# Patient Record
Sex: FEMALE | Race: WHITE | NOT HISPANIC OR LATINO | Employment: PART TIME | URBAN - NONMETROPOLITAN AREA
[De-identification: names, ages, dates, MRNs, and addresses within clinical notes are randomized per-mention and may not be internally consistent; named-entity substitution may affect disease eponyms.]

---

## 2018-07-06 ENCOUNTER — OFFICE VISIT (OUTPATIENT)
Dept: URGENT CARE | Facility: PHYSICIAN GROUP | Age: 28
End: 2018-07-06
Payer: MEDICAID

## 2018-07-06 VITALS
HEIGHT: 62 IN | DIASTOLIC BLOOD PRESSURE: 70 MMHG | HEART RATE: 80 BPM | RESPIRATION RATE: 14 BRPM | BODY MASS INDEX: 26.68 KG/M2 | SYSTOLIC BLOOD PRESSURE: 108 MMHG | WEIGHT: 145 LBS | OXYGEN SATURATION: 99 % | TEMPERATURE: 99.1 F

## 2018-07-06 DIAGNOSIS — J02.0 STREP PHARYNGITIS: ICD-10-CM

## 2018-07-06 PROCEDURE — 99214 OFFICE O/P EST MOD 30 MIN: CPT | Performed by: PHYSICIAN ASSISTANT

## 2018-07-06 RX ORDER — AZITHROMYCIN 250 MG/1
TABLET, FILM COATED ORAL
Qty: 6 TAB | Refills: 0 | Status: SHIPPED | OUTPATIENT
Start: 2018-07-06 | End: 2021-04-08

## 2018-07-06 NOTE — LETTER
July 6, 2018         Patient: Veronica Rajan   YOB: 1990   Date of Visit: 7/6/2018           To Whom it May Concern:    Veronica Rajan was seen in my clinic on 7/6/2018. She may return to work on 07/08/2018, please excuse any recent absences.    If you have any questions or concerns, please don't hesitate to call.        Sincerely,           Marek Llanes P.A.-C.  Electronically Signed

## 2018-07-06 NOTE — PROGRESS NOTES
Chief Complaint   Patient presents with   • Pharyngitis     X 6 days       HISTORY OF PRESENT ILLNESS: Patient is a 28 y.o. female who presents today because she has a 6 day history of sore throat, she has some associated fevers, chills, body aches and mild nausea.  She has been taking Tylenol for her symptoms without improvement.    Patient Active Problem List    Diagnosis Date Noted   • Palpitations 04/03/2012     Priority: High   • Atypical chest pain 04/03/2012     Priority: High   • Shortness of breath 04/03/2012     Priority: High   • APC (atrial premature contractions) 04/03/2012     Priority: Medium   • VPC (ventricular premature complex) 04/03/2012     Priority: Medium   • Other diseases of nasal cavity and sinuses 07/27/2015   • Fibromyalgia 01/09/2014   • Hypothyroid 11/11/2011   • Acne 11/11/2011   • Contraception 11/11/2011       Allergies:Morphine; Other food; Pcn [penicillins]; Peanuts [peanut oil]; and Tetracycline    Current Outpatient Prescriptions Ordered in [facility]   Medication Sig Dispense Refill   • azithromycin (ZITHROMAX) 250 MG Tab Follow package directions 6 Tab 0   • levothyroxine (SYNTHROID) 75 MCG TABS Take 1 Tab by mouth every day. 90 Tab 3   • albuterol (VENTOLIN OR PROVENTIL) 108 (90 BASE) MCG/ACT Aero Soln inhalation aerosol Inhale 2 Puffs by mouth every 6 hours as needed for Shortness of Breath. 8.5 g 1   • hydrocod polst-chlorphen polst (TUSSIONEX) 10-8 MG/5ML Liquid CR Take 5 mL by mouth every 12 hours. 50 mL 0   • IV Sets-Tubing (LTXF PRIM CNV PIN MICRODRIP) Misc by Does not apply route.     • albuterol (VENTOLIN OR PROVENTIL) 108 (90 BASE) MCG/ACT Aero Soln inhalation aerosol Inhale 2 Puffs by mouth every 6 hours as needed for Shortness of Breath. 8.5 g 0   • fluticasone (FLONASE) 50 MCG/ACT nasal spray Spray 2 Sprays in nose every day. 16 g 0   • hyoscyamine (LEVSIN) 0.125 MG tablet Take 125 mcg by mouth every four hours as needed for Cramping.     • famotidine (PEPCID) 20 MG  "TABS Take 20 mg by mouth at bedtime as needed.     • Probiotic Product (PROBIOTIC DAILY PO) Take  by mouth every day.     • Non Formulary Request 5HTP   daily     • cyclobenzaprine (FLEXERIL) 10 MG TABS Take 1 Tab by mouth every day. 90 Tab 0   • omeprazole (PRILOSEC) 20 MG CPDR Take 1 Cap by mouth every day. 30 Cap 11   • gabapentin (NEURONTIN) 300 MG CAPS Take 300 mg by mouth 3 times a day.       No current [facility]-ordered facility-administered medications on file.        Past Medical History:   Diagnosis Date   • Acne 11/11/2011   • Anesthesia     \"didn't properly sedate all the way\", woke up in the middle, recommedation for Propofol   • Bronchitis 2010   • Contraception 11/11/2011   • GERD (gastroesophageal reflux disease)    • Heart burn    • Hypothyroid 11/11/2011   • Indigestion    • Infectious disease 2007    c diff   • Insomnia    • Jaundice     as a baby   • Muscle disorder     muscle pain   • Other specified disorder of intestines 7/2015    constipation and diarrhea       Social History   Substance Use Topics   • Smoking status: Never Smoker   • Smokeless tobacco: Never Used   • Alcohol use No       Family Status   Relation Status   • Father Alive   • Mother Alive   • Sister Alive   • Brother Alive   • Sister Alive   • Sister Alive     Family History   Problem Relation Age of Onset   • Stroke Father    • Hypertension Father        ROS:  Review of Systems   Constitutional: Positive for fever, chills, myalgias and malaise/fatigue.   HENT: Negative for ear pain, nosebleeds, congestion, positive for sore throat and neck pain.    Eyes: Negative for blurred vision.   Respiratory: Negative for cough, sputum production, shortness of breath and wheezing.    Cardiovascular: Negative for chest pain, palpitations, orthopnea and leg swelling.   Gastrointestinal: Negative for heartburn, positive for mild nausea, without vomiting and abdominal pain.   Genitourinary: Negative for dysuria, urgency and frequency. " "    Exam:  Blood pressure 108/70, pulse 80, temperature 37.3 °C (99.1 °F), resp. rate 14, height 1.575 m (5' 2\"), weight 65.8 kg (145 lb), SpO2 99 %.  General:  Well nourished, well developed female in NAD  Head:Normocephalic, atraumatic  Eyes: PERRLA, EOM within normal limits, no conjunctival injection, no scleral icterus, visual fields and acuity grossly intact.  Ears: Normal shape and symmetry, no tenderness, no discharge. External canals are without any significant edema or erythema. Tympanic membranes are without any inflammation, no effusion. Gross auditory acuity is intact  Nose: Symmetrical without tenderness, no discharge.  Mouth: reasonable hygiene, she has pharyngeal and tonsillar erythema, bilateral exudates and bilateral tonsillar enlargement.  Uvula is midline  Neck: She has bilateral anterior cervical lymph node enlargement and tenderness, range of motion within normal limits, no tracheal deviation. No obvious thyroid enlargement.  Pulmonary: chest is symmetrical with respiration, no wheezes, crackles, or rhonchi.  Cardiovascular: regular rate and rhythm without murmurs, rubs, or gallops.  Extremities: no clubbing, cyanosis, or edema.    Please note that this dictation was created using voice recognition software. I have made every reasonable attempt to correct obvious errors, but I expect that there are errors of grammar and possibly content that I did not discover before finalizing the note.    Assessment/Plan:  1. Strep pharyngitis  azithromycin (ZITHROMAX) 250 MG Tab   Meet centor criteria for treatment.  Continue Tylenol or ibuprofen as tolerated    Followup with primary care in the next 7-10 days if not significantly improving, return to the urgent care or go to the emergency room sooner for any worsening of symptoms.       "

## 2019-04-30 ENCOUNTER — OFFICE VISIT (OUTPATIENT)
Dept: URGENT CARE | Facility: PHYSICIAN GROUP | Age: 29
End: 2019-04-30
Payer: COMMERCIAL

## 2019-04-30 VITALS
HEART RATE: 88 BPM | BODY MASS INDEX: 27.42 KG/M2 | TEMPERATURE: 98 F | HEIGHT: 62 IN | SYSTOLIC BLOOD PRESSURE: 102 MMHG | DIASTOLIC BLOOD PRESSURE: 76 MMHG | RESPIRATION RATE: 14 BRPM | WEIGHT: 149 LBS | OXYGEN SATURATION: 97 %

## 2019-04-30 DIAGNOSIS — J02.9 SORE THROAT: ICD-10-CM

## 2019-04-30 DIAGNOSIS — R09.82 POST-NASAL DRAINAGE: ICD-10-CM

## 2019-04-30 DIAGNOSIS — K29.70 GASTRITIS WITHOUT BLEEDING, UNSPECIFIED CHRONICITY, UNSPECIFIED GASTRITIS TYPE: ICD-10-CM

## 2019-04-30 LAB
INT CON NEG: NEGATIVE
INT CON POS: POSITIVE
S PYO AG THROAT QL: NORMAL

## 2019-04-30 PROCEDURE — 99214 OFFICE O/P EST MOD 30 MIN: CPT | Performed by: PHYSICIAN ASSISTANT

## 2019-04-30 PROCEDURE — 87880 STREP A ASSAY W/OPTIC: CPT | Performed by: PHYSICIAN ASSISTANT

## 2019-04-30 RX ORDER — FLUTICASONE PROPIONATE 50 MCG
1 SPRAY, SUSPENSION (ML) NASAL 2 TIMES DAILY
Qty: 1 BOTTLE | Refills: 0 | Status: SHIPPED | OUTPATIENT
Start: 2019-04-30 | End: 2021-05-06

## 2019-04-30 RX ORDER — OMEPRAZOLE 20 MG/1
20 CAPSULE, DELAYED RELEASE ORAL DAILY
Qty: 30 CAP | Refills: 0 | Status: SHIPPED | OUTPATIENT
Start: 2019-04-30 | End: 2024-03-08

## 2019-04-30 NOTE — PROGRESS NOTES
Chief Complaint   Patient presents with   • Pharyngitis       HISTORY OF PRESENT ILLNESS: Patient is a 28 y.o. female who presents today because she has a several day history of a sore throat.  She has had some body aches.  Her symptoms were preceded by a week or so of abdominal bloating, back and forth constipation and diarrhea.  She used to be on Prilosec but has not been on it in quite some time.  She does report some history of heartburn as well.  Eyes any fevers, chills, nausea, vomiting    Patient Active Problem List    Diagnosis Date Noted   • Palpitations 04/03/2012     Priority: High   • Atypical chest pain 04/03/2012     Priority: High   • Shortness of breath 04/03/2012     Priority: High   • APC (atrial premature contractions) 04/03/2012     Priority: Medium   • VPC (ventricular premature complex) 04/03/2012     Priority: Medium   • Other diseases of nasal cavity and sinuses 07/27/2015   • Fibromyalgia 01/09/2014   • Hypothyroid 11/11/2011   • Acne 11/11/2011   • Contraception 11/11/2011       Allergies:Morphine; Other food; Pcn [penicillins]; Peanuts [peanut oil]; and Tetracycline    Current Outpatient Prescriptions Ordered in [facility]   Medication Sig Dispense Refill   • Montelukast Sodium (SINGULAIR PO) Take  by mouth.     • fluticasone (FLONASE) 50 MCG/ACT nasal spray Spray 1 Spray in nose 2 times a day. 1 Bottle 0   • maalox plus-benadryl-visc lidocaine (MAGIC MOUTHWASH) Take 5 mL by mouth every 6 hours as needed. 90 mL 0   • omeprazole (PRILOSEC) 20 MG delayed-release capsule Take 1 Cap by mouth every day. 30 Cap 0   • levothyroxine (SYNTHROID) 75 MCG TABS Take 1 Tab by mouth every day. 90 Tab 3   • azithromycin (ZITHROMAX) 250 MG Tab Follow package directions 6 Tab 0   • albuterol (VENTOLIN OR PROVENTIL) 108 (90 BASE) MCG/ACT Aero Soln inhalation aerosol Inhale 2 Puffs by mouth every 6 hours as needed for Shortness of Breath. 8.5 g 1   • hydrocod polst-chlorphen polst (TUSSIONEX) 10-8 MG/5ML Liquid CR  "Take 5 mL by mouth every 12 hours. 50 mL 0   • IV Sets-Tubing (LTXF PRIM CNV PIN MICRODRIP) Misc by Does not apply route.     • albuterol (VENTOLIN OR PROVENTIL) 108 (90 BASE) MCG/ACT Aero Soln inhalation aerosol Inhale 2 Puffs by mouth every 6 hours as needed for Shortness of Breath. 8.5 g 0   • fluticasone (FLONASE) 50 MCG/ACT nasal spray Spray 2 Sprays in nose every day. 16 g 0   • hyoscyamine (LEVSIN) 0.125 MG tablet Take 125 mcg by mouth every four hours as needed for Cramping.     • famotidine (PEPCID) 20 MG TABS Take 20 mg by mouth at bedtime as needed.     • Probiotic Product (PROBIOTIC DAILY PO) Take  by mouth every day.     • Non Formulary Request 5HTP   daily     • cyclobenzaprine (FLEXERIL) 10 MG TABS Take 1 Tab by mouth every day. 90 Tab 0   • omeprazole (PRILOSEC) 20 MG CPDR Take 1 Cap by mouth every day. 30 Cap 11   • gabapentin (NEURONTIN) 300 MG CAPS Take 300 mg by mouth 3 times a day.       No current [facility]-ordered facility-administered medications on file.        Past Medical History:   Diagnosis Date   • Acne 11/11/2011   • Anesthesia     \"didn't properly sedate all the way\", woke up in the middle, recommedation for Propofol   • Bronchitis 2010   • Contraception 11/11/2011   • GERD (gastroesophageal reflux disease)    • Heart burn    • Hypothyroid 11/11/2011   • Indigestion    • Infectious disease 2007    c diff   • Insomnia    • Jaundice     as a baby   • Muscle disorder     muscle pain   • Other specified disorder of intestines 7/2015    constipation and diarrhea       Social History   Substance Use Topics   • Smoking status: Never Smoker   • Smokeless tobacco: Never Used   • Alcohol use No       Family Status   Relation Status   • Fa Alive   • Mo Alive   • Sis Alive   • Bro Alive   • Sis Alive   • Sis Alive     Family History   Problem Relation Age of Onset   • Stroke Father    • Hypertension Father        ROS:  Review of Systems   Constitutional: Negative for fever, chills, weight loss and " "malaise/fatigue.   HENT: Negative for ear pain, nosebleeds, positive for congestion, sore throat and no neck pain.    Eyes: Negative for blurred vision.   Respiratory: Negative for cough, sputum production, shortness of breath and wheezing.    Cardiovascular: Negative for chest pain, palpitations, orthopnea and leg swelling.   Gastrointestinal: Positive for bloating, heartburn, nausea, no vomiting and positive for midepigastric abdominal pain.   Genitourinary: Negative for dysuria, urgency and frequency.     Exam:  /76 (BP Location: Right arm, Patient Position: Sitting, BP Cuff Size: Adult)   Pulse 88   Temp 36.7 °C (98 °F) (Temporal)   Resp 14   Ht 1.575 m (5' 2\")   Wt 67.6 kg (149 lb)   SpO2 97%   General:  Well nourished, well developed female in NAD  Head:Normocephalic, atraumatic  Eyes: PERRLA, EOM within normal limits, no conjunctival injection, no scleral icterus, visual fields and acuity grossly intact.  Ears: Normal shape and symmetry, no tenderness, no discharge. External canals are without any significant edema or erythema. Tympanic membranes are without any inflammation, no effusion. Gross auditory acuity is intact  Nose: Symmetrical without tenderness, no discharge.  Nasal mucosa is edematous on the left with postnasal drainage  Mouth: reasonable hygiene, no pharyngeal erythema exudates or tonsillar enlargement.  Postnasal drainage is evident in the posterior pharynx  Neck: no masses, range of motion within normal limits, no tracheal deviation. No obvious thyroid enlargement.  Pulmonary: chest is symmetrical with respiration, no wheezes, crackles, or rhonchi.  Cardiovascular: regular rate and rhythm without murmurs, rubs, or gallops.  Extremities: no clubbing, cyanosis, or edema.    Strep negative    Please note that this dictation was created using voice recognition software. I have made every reasonable attempt to correct obvious errors, but I expect that there are errors of grammar and " possibly content that I did not discover before finalizing the note.    Assessment/Plan:  1. Post-nasal drainage  fluticasone (FLONASE) 50 MCG/ACT nasal spray   2. Sore throat  POCT Rapid Strep A    maalox plus-benadryl-visc lidocaine (MAGIC MOUTHWASH)   3. Gastritis without bleeding, unspecified chronicity, unspecified gastritis type  omeprazole (PRILOSEC) 20 MG delayed-release capsule       Followup with primary care in the next 7-10 days if not significantly improving, return to the urgent care or go to the emergency room sooner for any worsening of symptoms.

## 2021-01-21 ENCOUNTER — HOSPITAL ENCOUNTER (OUTPATIENT)
Facility: MEDICAL CENTER | Age: 31
End: 2021-01-21
Attending: PHYSICIAN ASSISTANT
Payer: COMMERCIAL

## 2021-01-21 ENCOUNTER — OFFICE VISIT (OUTPATIENT)
Dept: URGENT CARE | Facility: PHYSICIAN GROUP | Age: 31
End: 2021-01-21
Payer: COMMERCIAL

## 2021-01-21 VITALS
DIASTOLIC BLOOD PRESSURE: 72 MMHG | WEIGHT: 138 LBS | BODY MASS INDEX: 23.56 KG/M2 | HEIGHT: 64 IN | HEART RATE: 64 BPM | RESPIRATION RATE: 16 BRPM | SYSTOLIC BLOOD PRESSURE: 116 MMHG | TEMPERATURE: 98.4 F | OXYGEN SATURATION: 100 %

## 2021-01-21 DIAGNOSIS — J06.9 UPPER RESPIRATORY TRACT INFECTION, UNSPECIFIED TYPE: ICD-10-CM

## 2021-01-21 DIAGNOSIS — Z20.822 EXPOSURE TO COVID-19 VIRUS: ICD-10-CM

## 2021-01-21 LAB
COVID ORDER STATUS COVID19: NORMAL
SARS-COV-2 RNA RESP QL NAA+PROBE: NOTDETECTED
SPECIMEN SOURCE: NORMAL

## 2021-01-21 PROCEDURE — 99214 OFFICE O/P EST MOD 30 MIN: CPT | Performed by: PHYSICIAN ASSISTANT

## 2021-01-21 PROCEDURE — U0005 INFEC AGEN DETEC AMPLI PROBE: HCPCS

## 2021-01-21 PROCEDURE — U0003 INFECTIOUS AGENT DETECTION BY NUCLEIC ACID (DNA OR RNA); SEVERE ACUTE RESPIRATORY SYNDROME CORONAVIRUS 2 (SARS-COV-2) (CORONAVIRUS DISEASE [COVID-19]), AMPLIFIED PROBE TECHNIQUE, MAKING USE OF HIGH THROUGHPUT TECHNOLOGIES AS DESCRIBED BY CMS-2020-01-R: HCPCS

## 2021-01-21 NOTE — PROGRESS NOTES
Chief Complaint   Patient presents with   • Fever   • Nausea/Vomiting/Diarrhea       HISTORY OF PRESENT ILLNESS: Patient is a 30 y.o. female who presents today because of a 2-day history of fever, chills, nausea, vomiting, diarrhea, headaches, body aches, cough.  She has been taking some over-the-counter Tylenol for her symptoms without any improvement.  Incidentally her symptoms started 1 week after being exposed to a contact that was Covid positive    Patient Active Problem List    Diagnosis Date Noted   • Palpitations 04/03/2012     Priority: High   • Atypical chest pain 04/03/2012     Priority: High   • Shortness of breath 04/03/2012     Priority: High   • APC (atrial premature contractions) 04/03/2012     Priority: Medium   • VPC (ventricular premature complex) 04/03/2012     Priority: Medium   • Other diseases of nasal cavity and sinuses 07/27/2015   • Fibromyalgia 01/09/2014   • Hypothyroid 11/11/2011   • Acne 11/11/2011   • Contraception 11/11/2011       Allergies:Morphine, Other food, Pcn [penicillins], Peanuts [peanut oil], and Tetracycline    Current Outpatient Medications Ordered in [facility]   Medication Sig Dispense Refill   • Probiotic Product (PROBIOTIC DAILY PO) Take  by mouth every day.     • levothyroxine (SYNTHROID) 75 MCG TABS Take 1 Tab by mouth every day. 90 Tab 3   • Montelukast Sodium (SINGULAIR PO) Take  by mouth.     • fluticasone (FLONASE) 50 MCG/ACT nasal spray Spray 1 Spray in nose 2 times a day. (Patient not taking: Reported on 1/21/2021) 1 Bottle 0   • maalox plus-benadryl-visc lidocaine (MAGIC MOUTHWASH) Take 5 mL by mouth every 6 hours as needed. 90 mL 0   • omeprazole (PRILOSEC) 20 MG delayed-release capsule Take 1 Cap by mouth every day. 30 Cap 0   • azithromycin (ZITHROMAX) 250 MG Tab Follow package directions 6 Tab 0   • albuterol (VENTOLIN OR PROVENTIL) 108 (90 BASE) MCG/ACT Aero Soln inhalation aerosol Inhale 2 Puffs by mouth every 6 hours as needed for Shortness of Breath. 8.5 g 1  "  • hydrocod polst-chlorphen polst (TUSSIONEX) 10-8 MG/5ML Liquid CR Take 5 mL by mouth every 12 hours. 50 mL 0   • IV Sets-Tubing (LTXF PRIM CNV PIN MICRODRIP) Misc by Does not apply route.     • albuterol (VENTOLIN OR PROVENTIL) 108 (90 BASE) MCG/ACT Aero Soln inhalation aerosol Inhale 2 Puffs by mouth every 6 hours as needed for Shortness of Breath. 8.5 g 0   • fluticasone (FLONASE) 50 MCG/ACT nasal spray Spray 2 Sprays in nose every day. 16 g 0   • hyoscyamine (LEVSIN) 0.125 MG tablet Take 125 mcg by mouth every four hours as needed for Cramping.     • famotidine (PEPCID) 20 MG TABS Take 20 mg by mouth at bedtime as needed.     • Non Formulary Request 5HTP   daily     • cyclobenzaprine (FLEXERIL) 10 MG TABS Take 1 Tab by mouth every day. 90 Tab 0   • omeprazole (PRILOSEC) 20 MG CPDR Take 1 Cap by mouth every day. (Patient not taking: Reported on 1/21/2021) 30 Cap 11   • gabapentin (NEURONTIN) 300 MG CAPS Take 300 mg by mouth 3 times a day.       No current [facility]-ordered facility-administered medications on file.        Past Medical History:   Diagnosis Date   • Acne 11/11/2011   • Anesthesia     \"didn't properly sedate all the way\", woke up in the middle, recommedation for Propofol   • Bronchitis 2010   • Contraception 11/11/2011   • GERD (gastroesophageal reflux disease)    • Heart burn    • Hypothyroid 11/11/2011   • Indigestion    • Infectious disease 2007    c diff   • Insomnia    • Jaundice     as a baby   • Muscle disorder     muscle pain   • Other specified disorder of intestines 7/2015    constipation and diarrhea       Social History     Tobacco Use   • Smoking status: Never Smoker   • Smokeless tobacco: Never Used   Substance Use Topics   • Alcohol use: No   • Drug use: No       Family Status   Relation Name Status   • Fa  Alive   • Mo  Alive   • Sis  Alive   • Bro  Alive   • Sis  Alive   • Sis  Alive     Family History   Problem Relation Age of Onset   • Stroke Father    • Hypertension Father  " "      ROS:  Review of Systems   Constitutional: Positive for fever, chills, myalgias and malaise/fatigue.   HENT: Negative for ear pain, nosebleeds, congestion, sore throat and neck pain.    Eyes: Negative for blurred vision.   Respiratory: Positive for cough, no sputum production, shortness of breath and wheezing.    Cardiovascular: Negative for chest pain, palpitations, orthopnea and leg swelling.   Gastrointestinal: Negative for heartburn, positive for mild nausea, vomiting and no abdominal pain.   Genitourinary: Negative for dysuria, urgency and frequency.     Exam:  /72 (BP Location: Right arm, Patient Position: Sitting, BP Cuff Size: Adult)   Pulse 64   Temp 36.9 °C (98.4 °F) (Temporal)   Resp 16   Ht 1.626 m (5' 4\")   Wt 62.6 kg (138 lb)   SpO2 100%   General:  Well nourished, well developed female in NAD  Head:Normocephalic, atraumatic  Eyes: PERRLA, EOM within normal limits, no conjunctival injection, no scleral icterus, visual fields and acuity grossly intact.  Ears: Normal shape and symmetry, no tenderness, no discharge. External canals are without any significant edema or erythema. Tympanic membranes are without any inflammation, no effusion. Gross auditory acuity is intact  Nose: Symmetrical without tenderness, no discharge.  Mouth: reasonable hygiene, no erythema exudates or tonsillar enlargement.  Neck: no masses, range of motion within normal limits, no tracheal deviation. No obvious thyroid enlargement.  Pulmonary: chest is symmetrical with respiration, no wheezes, crackles, or rhonchi.  Cardiovascular: regular rate and rhythm without murmurs, rubs, or gallops.  Extremities: no clubbing, cyanosis, or edema.    Please note that this dictation was created using voice recognition software. I have made every reasonable attempt to correct obvious errors, but I expect that there are errors of grammar and possibly content that I did not discover before finalizing the " note.    Assessment/Plan:  1. Upper respiratory tract infection, unspecified type  SARS-CoV-2 PCR (24 hour In-House): Collect NP swab in VTM   2. Exposure to COVID-19 virus  SARS-CoV-2 PCR (24 hour In-House): Collect NP swab in VTM   Discussed over-the-counter symptomatic relief as needed, strict isolation until Covid test returns    Followup with primary care in the next 7-10 days if not significantly improving, return to the urgent care or go to the emergency room sooner for any worsening of symptoms.

## 2021-04-08 ENCOUNTER — HOSPITAL ENCOUNTER (OUTPATIENT)
Facility: MEDICAL CENTER | Age: 31
End: 2021-04-08
Attending: PHYSICIAN ASSISTANT
Payer: COMMERCIAL

## 2021-04-08 ENCOUNTER — OFFICE VISIT (OUTPATIENT)
Dept: URGENT CARE | Facility: PHYSICIAN GROUP | Age: 31
End: 2021-04-08
Payer: COMMERCIAL

## 2021-04-08 VITALS
BODY MASS INDEX: 22.02 KG/M2 | HEIGHT: 64 IN | RESPIRATION RATE: 16 BRPM | DIASTOLIC BLOOD PRESSURE: 62 MMHG | OXYGEN SATURATION: 99 % | TEMPERATURE: 97.5 F | SYSTOLIC BLOOD PRESSURE: 110 MMHG | HEART RATE: 72 BPM | WEIGHT: 129 LBS

## 2021-04-08 DIAGNOSIS — N30.01 ACUTE CYSTITIS WITH HEMATURIA: ICD-10-CM

## 2021-04-08 DIAGNOSIS — R30.0 DYSURIA: ICD-10-CM

## 2021-04-08 LAB
APPEARANCE UR: NORMAL
BILIRUB UR STRIP-MCNC: NORMAL MG/DL
COLOR UR AUTO: NORMAL
GLUCOSE UR STRIP.AUTO-MCNC: NORMAL MG/DL
KETONES UR STRIP.AUTO-MCNC: NORMAL MG/DL
LEUKOCYTE ESTERASE UR QL STRIP.AUTO: NORMAL
NITRITE UR QL STRIP.AUTO: NORMAL
PH UR STRIP.AUTO: 6 [PH] (ref 5–8)
PROT UR QL STRIP: NORMAL MG/DL
RBC UR QL AUTO: NORMAL
SP GR UR STRIP.AUTO: 1.01
UROBILINOGEN UR STRIP-MCNC: NORMAL MG/DL

## 2021-04-08 PROCEDURE — 81002 URINALYSIS NONAUTO W/O SCOPE: CPT | Performed by: PHYSICIAN ASSISTANT

## 2021-04-08 PROCEDURE — 99000 SPECIMEN HANDLING OFFICE-LAB: CPT | Performed by: PHYSICIAN ASSISTANT

## 2021-04-08 PROCEDURE — 87086 URINE CULTURE/COLONY COUNT: CPT

## 2021-04-08 PROCEDURE — 87077 CULTURE AEROBIC IDENTIFY: CPT

## 2021-04-08 PROCEDURE — 99214 OFFICE O/P EST MOD 30 MIN: CPT | Performed by: PHYSICIAN ASSISTANT

## 2021-04-08 PROCEDURE — 87186 SC STD MICRODIL/AGAR DIL: CPT

## 2021-04-08 RX ORDER — NITROFURANTOIN 25; 75 MG/1; MG/1
100 CAPSULE ORAL EVERY 12 HOURS
Qty: 10 CAPSULE | Refills: 0 | Status: SHIPPED | OUTPATIENT
Start: 2021-04-08 | End: 2021-04-13

## 2021-04-08 RX ORDER — PHENAZOPYRIDINE HYDROCHLORIDE 200 MG/1
200 TABLET, FILM COATED ORAL 3 TIMES DAILY
Qty: 6 TABLET | Refills: 0 | Status: SHIPPED | OUTPATIENT
Start: 2021-04-08 | End: 2021-04-10

## 2021-04-08 NOTE — PROGRESS NOTES
Chief Complaint   Patient presents with   • Painful Urination       HISTORY OF PRESENT ILLNESS: Patient is a 30 y.o. female who presents today because she has a 2 to 3-week history of waxing and waning painful urination, mild nausea, intermittent low-grade fever and chills.  She has been trying to use over-the-counter remedies without improvement.    Patient Active Problem List    Diagnosis Date Noted   • Palpitations 04/03/2012   • Atypical chest pain 04/03/2012   • Shortness of breath 04/03/2012   • APC (atrial premature contractions) 04/03/2012   • VPC (ventricular premature complex) 04/03/2012   • Other diseases of nasal cavity and sinuses 07/27/2015   • Fibromyalgia 01/09/2014   • Hypothyroid 11/11/2011   • Acne 11/11/2011   • Contraception 11/11/2011       Allergies:Morphine, Other food, Pcn [penicillins], Peanuts [peanut oil], and Tetracycline    Current Outpatient Medications Ordered in Epic   Medication Sig Dispense Refill   • nitrofurantoin (MACROBID) 100 MG Cap Take 1 capsule by mouth every 12 hours for 5 days. 10 capsule 0   • phenazopyridine (PYRIDIUM) 200 MG Tab Take 1 tablet by mouth 3 times a day for 2 days. 6 tablet 0   • Probiotic Product (PROBIOTIC DAILY PO) Take  by mouth every day.     • levothyroxine (SYNTHROID) 75 MCG TABS Take 1 Tab by mouth every day. 90 Tab 3   • Montelukast Sodium (SINGULAIR PO) Take  by mouth.     • fluticasone (FLONASE) 50 MCG/ACT nasal spray Spray 1 Spray in nose 2 times a day. (Patient not taking: Reported on 1/21/2021) 1 Bottle 0   • maalox plus-benadryl-visc lidocaine (MAGIC MOUTHWASH) Take 5 mL by mouth every 6 hours as needed. 90 mL 0   • omeprazole (PRILOSEC) 20 MG delayed-release capsule Take 1 Cap by mouth every day. 30 Cap 0   • azithromycin (ZITHROMAX) 250 MG Tab Follow package directions 6 Tab 0   • albuterol (VENTOLIN OR PROVENTIL) 108 (90 BASE) MCG/ACT Aero Soln inhalation aerosol Inhale 2 Puffs by mouth every 6 hours as needed for Shortness of Breath. 8.5  "g 1   • hydrocod polst-chlorphen polst (TUSSIONEX) 10-8 MG/5ML Liquid CR Take 5 mL by mouth every 12 hours. 50 mL 0   • IV Sets-Tubing (LTXF PRIM CNV PIN MICRODRIP) Misc by Does not apply route.     • albuterol (VENTOLIN OR PROVENTIL) 108 (90 BASE) MCG/ACT Aero Soln inhalation aerosol Inhale 2 Puffs by mouth every 6 hours as needed for Shortness of Breath. 8.5 g 0   • fluticasone (FLONASE) 50 MCG/ACT nasal spray Spray 2 Sprays in nose every day. 16 g 0   • hyoscyamine (LEVSIN) 0.125 MG tablet Take 125 mcg by mouth every four hours as needed for Cramping.     • famotidine (PEPCID) 20 MG TABS Take 20 mg by mouth at bedtime as needed.     • Non Formulary Request 5HTP   daily     • cyclobenzaprine (FLEXERIL) 10 MG TABS Take 1 Tab by mouth every day. 90 Tab 0   • omeprazole (PRILOSEC) 20 MG CPDR Take 1 Cap by mouth every day. (Patient not taking: Reported on 1/21/2021) 30 Cap 11   • gabapentin (NEURONTIN) 300 MG CAPS Take 300 mg by mouth 3 times a day.       No current [facility]-ordered facility-administered medications on file.       Past Medical History:   Diagnosis Date   • Acne 11/11/2011   • Anesthesia     \"didn't properly sedate all the way\", woke up in the middle, recommedation for Propofol   • Bronchitis 2010   • Contraception 11/11/2011   • GERD (gastroesophageal reflux disease)    • Heart burn    • Hypothyroid 11/11/2011   • Indigestion    • Infectious disease 2007    c diff   • Insomnia    • Jaundice     as a baby   • Muscle disorder     muscle pain   • Other specified disorder of intestines 7/2015    constipation and diarrhea       Social History     Tobacco Use   • Smoking status: Never Smoker   • Smokeless tobacco: Never Used   Substance Use Topics   • Alcohol use: No   • Drug use: No       Family Status   Relation Name Status   • Fa  Alive   • Mo  Alive   • Sis  Alive   • Bro  Alive   • Sis  Alive   • Sis  Alive     Family History   Problem Relation Age of Onset   • Stroke Father    • Hypertension Father  " "      ROS:  Review of Systems   Constitutional: Positive for mild intermittent fevers and chills, and no weight loss and malaise/fatigue.   HENT: Negative for ear pain, nosebleeds, congestion, sore throat and neck pain.    Eyes: Negative for blurred vision.   Respiratory: Negative for cough, sputum production, shortness of breath and wheezing.    Cardiovascular: Negative for chest pain, palpitations, orthopnea and leg swelling.   Gastrointestinal: Negative for heartburn, mild nausea, no vomiting and abdominal pain.   Genitourinary: Positive for dysuria, urgency and frequency.     Exam:  /62 (BP Location: Right arm, Patient Position: Sitting, BP Cuff Size: Adult)   Pulse 72   Temp 36.4 °C (97.5 °F) (Temporal)   Resp 16   Ht 1.626 m (5' 4\")   Wt 58.5 kg (129 lb)   SpO2 99%   General:  Well nourished, well developed female in NAD  Head:Normocephalic, atraumatic  Eyes: PERRLA, EOM within normal limits, no conjunctival injection, no scleral icterus, visual fields and acuity grossly intact.  Nose: Symmetrical without tenderness, no discharge.  Mouth: reasonable hygiene, no erythema exudates or tonsillar enlargement.  Neck: no masses, range of motion within normal limits, no tracheal deviation. No obvious thyroid enlargement.  Extremities: no clubbing, cyanosis, or edema.    Urinalysis positive for leuks and blood.    Please note that this dictation was created using voice recognition software. I have made every reasonable attempt to correct obvious errors, but I expect that there are errors of grammar and possibly content that I did not discover before finalizing the note.    Assessment/Plan:  1. Acute cystitis with hematuria  Urine Culture    nitrofurantoin (MACROBID) 100 MG Cap   2. Dysuria  POCT Urinalysis    phenazopyridine (PYRIDIUM) 200 MG Tab   Increase p.o. fluids    Followup with primary care in the next 7-10 days if not significantly improving, return to the urgent care or go to the emergency room " sooner for any worsening of symptoms.

## 2021-04-11 LAB
BACTERIA UR CULT: ABNORMAL
BACTERIA UR CULT: ABNORMAL
SIGNIFICANT IND 70042: ABNORMAL
SITE SITE: ABNORMAL
SOURCE SOURCE: ABNORMAL

## 2021-04-24 ENCOUNTER — SUPERVISING PHYSICIAN REVIEW (OUTPATIENT)
Dept: URGENT CARE | Facility: PHYSICIAN GROUP | Age: 31
End: 2021-04-24

## 2021-05-06 ENCOUNTER — HOSPITAL ENCOUNTER (OUTPATIENT)
Facility: MEDICAL CENTER | Age: 31
End: 2021-05-06
Attending: NURSE PRACTITIONER
Payer: COMMERCIAL

## 2021-05-06 ENCOUNTER — OFFICE VISIT (OUTPATIENT)
Dept: URGENT CARE | Facility: PHYSICIAN GROUP | Age: 31
End: 2021-05-06
Payer: COMMERCIAL

## 2021-05-06 VITALS
RESPIRATION RATE: 12 BRPM | DIASTOLIC BLOOD PRESSURE: 78 MMHG | HEIGHT: 64 IN | OXYGEN SATURATION: 98 % | WEIGHT: 140 LBS | TEMPERATURE: 99 F | BODY MASS INDEX: 23.9 KG/M2 | SYSTOLIC BLOOD PRESSURE: 110 MMHG | HEART RATE: 66 BPM

## 2021-05-06 DIAGNOSIS — N30.01 ACUTE CYSTITIS WITH HEMATURIA: ICD-10-CM

## 2021-05-06 DIAGNOSIS — R30.9 PAINFUL URINATION: ICD-10-CM

## 2021-05-06 LAB
APPEARANCE UR: CLEAR
BILIRUB UR STRIP-MCNC: NORMAL MG/DL
COLOR UR AUTO: YELLOW
GLUCOSE UR STRIP.AUTO-MCNC: NORMAL MG/DL
KETONES UR STRIP.AUTO-MCNC: NORMAL MG/DL
LEUKOCYTE ESTERASE UR QL STRIP.AUTO: NORMAL
NITRITE UR QL STRIP.AUTO: NORMAL
PH UR STRIP.AUTO: 7 [PH] (ref 5–8)
PROT UR QL STRIP: NORMAL MG/DL
RBC UR QL AUTO: NORMAL
SP GR UR STRIP.AUTO: 1.02
UROBILINOGEN UR STRIP-MCNC: 0.2 MG/DL

## 2021-05-06 PROCEDURE — 81002 URINALYSIS NONAUTO W/O SCOPE: CPT | Performed by: NURSE PRACTITIONER

## 2021-05-06 PROCEDURE — 87077 CULTURE AEROBIC IDENTIFY: CPT

## 2021-05-06 PROCEDURE — 87086 URINE CULTURE/COLONY COUNT: CPT

## 2021-05-06 PROCEDURE — 99214 OFFICE O/P EST MOD 30 MIN: CPT | Performed by: NURSE PRACTITIONER

## 2021-05-06 RX ORDER — PHENAZOPYRIDINE HYDROCHLORIDE 200 MG/1
200 TABLET, FILM COATED ORAL 3 TIMES DAILY PRN
Qty: 6 TABLET | Refills: 0 | Status: SHIPPED | OUTPATIENT
Start: 2021-05-06 | End: 2024-03-08

## 2021-05-06 RX ORDER — NITROFURANTOIN 25; 75 MG/1; MG/1
100 CAPSULE ORAL EVERY 12 HOURS
Qty: 10 CAPSULE | Refills: 0 | Status: SHIPPED | OUTPATIENT
Start: 2021-05-06 | End: 2021-05-11

## 2021-05-06 ASSESSMENT — ENCOUNTER SYMPTOMS
VOMITING: 0
BACK PAIN: 0
FEVER: 0
NAUSEA: 0
DIZZINESS: 0
FLANK PAIN: 0
HEADACHES: 0
CHILLS: 0

## 2021-05-06 NOTE — PROGRESS NOTES
"Veronica Mix is a 30 y.o. female who presents for Painful Urination (onset 4-5 days ago )      HPI This is a new problem.  C/o dysuria. Hx of UTI a few months ago. Recently . No previous UTI's for years. Has tried to increase her fluid intake. No significant relief of sx. No other aggravating or alleviating factors.       Review of Systems   Constitutional: Negative for chills and fever.   Gastrointestinal: Negative for nausea and vomiting.   Genitourinary: Negative for dysuria and flank pain.   Musculoskeletal: Negative for back pain.   Neurological: Negative for dizziness and headaches.   Endo/Heme/Allergies: Negative for environmental allergies.       Allergies   Allergen Reactions   • Morphine      Hand turned beet red and started spreading   • Other Food Itching     Blackberries-- swelling, rash   • Pcn [Penicillins] Rash     Itching, \" little bumps\"   • Peanuts [Peanut Oil]      Stomach swells, and stomach ache   • Tetracycline      Red rash and blotches     Past Medical History:   Diagnosis Date   • Acne 11/11/2011   • Anesthesia     \"didn't properly sedate all the way\", woke up in the middle, recommedation for Propofol   • Bronchitis 2010   • Contraception 11/11/2011   • GERD (gastroesophageal reflux disease)    • Heart burn    • Hypothyroid 11/11/2011   • Indigestion    • Infectious disease 2007    c diff   • Insomnia    • Jaundice     as a baby   • Muscle disorder     muscle pain   • Other specified disorder of intestines 7/2015    constipation and diarrhea     Past Surgical History:   Procedure Laterality Date   • SEPTAL RECONSTRUCTION N/A 7/27/2015    Procedure: SEPTAL RECONSTRUCTION OPEN W/ GRAFTS=MEDICAL;  Surgeon: KATHRINE Hernandez M.D.;  Location: SURGERY SAME DAY [facility] ORS;  Service:    • RHINOPLASTY N/A 7/27/2015    Procedure: RHINOPLASTY=COSMETIC;  Surgeon: KATHRINE Hernandez M.D.;  Location: SURGERY SAME DAY [facility] ORS;  Service:    • SEPTOTURBINOPLASTY  12/23/2008    " Performed by ARACELIS AGARWAL at SURGERY [facility] ORS   • ETHMOIDECTOMY  12/23/2008    Performed by ARACELIS AGARWAL at SURGERY [facility] ORS   • COLON OVERGROWTH  7/2/08    Performed by JG ENCARNACION at ENDOSCOPY [facility] ORS   • MALACHI BY LAPAROSCOPY       Family History   Problem Relation Age of Onset   • Stroke Father    • Hypertension Father      Social History     Tobacco Use   • Smoking status: Never Smoker   • Smokeless tobacco: Never Used   Substance Use Topics   • Alcohol use: No     Patient Active Problem List   Diagnosis   • Hypothyroid   • Acne   • Contraception   • Palpitations   • APC (atrial premature contractions)   • VPC (ventricular premature complex)   • Atypical chest pain   • Shortness of breath   • Fibromyalgia   • Other diseases of nasal cavity and sinuses     Current Outpatient Medications on File Prior to Visit   Medication Sig Dispense Refill   • albuterol (VENTOLIN OR PROVENTIL) 108 (90 BASE) MCG/ACT Aero Soln inhalation aerosol Inhale 2 Puffs by mouth every 6 hours as needed for Shortness of Breath. 8.5 g 1   • albuterol (VENTOLIN OR PROVENTIL) 108 (90 BASE) MCG/ACT Aero Soln inhalation aerosol Inhale 2 Puffs by mouth every 6 hours as needed for Shortness of Breath. 8.5 g 0   • fluticasone (FLONASE) 50 MCG/ACT nasal spray Spray 2 Sprays in nose every day. 16 g 0   • levothyroxine (SYNTHROID) 75 MCG TABS Take 1 Tab by mouth every day. 90 Tab 3   • Montelukast Sodium (SINGULAIR PO) Take  by mouth.     • omeprazole (PRILOSEC) 20 MG delayed-release capsule Take 1 Cap by mouth every day. (Patient not taking: Reported on 5/6/2021) 30 Cap 0   • IV Sets-Tubing (LTXF PRIM CNV PIN MICRODRIP) Misc by Does not apply route.     • hyoscyamine (LEVSIN) 0.125 MG tablet Take 125 mcg by mouth every four hours as needed for Cramping.     • famotidine (PEPCID) 20 MG TABS Take 20 mg by mouth at bedtime as needed.     • Probiotic Product (PROBIOTIC DAILY PO) Take  by mouth every day.     •  "gabapentin (NEURONTIN) 300 MG CAPS Take 300 mg by mouth 3 times a day.       No current facility-administered medications on file prior to visit.          Objective:     /78 (BP Location: Left arm, Patient Position: Sitting, BP Cuff Size: Adult)   Pulse 66   Temp 37.2 °C (99 °F)   Resp 12   Ht 1.626 m (5' 4\")   Wt 63.5 kg (140 lb)   SpO2 98%   BMI 24.03 kg/m²     Physical Exam  Vitals and nursing note reviewed.   Constitutional:       General: She is not in acute distress.     Appearance: Normal appearance. She is well-developed. She is not ill-appearing, toxic-appearing or diaphoretic.   HENT:      Head: Normocephalic.      Mouth/Throat:      Mouth: Mucous membranes are moist.   Cardiovascular:      Rate and Rhythm: Normal rate and regular rhythm.   Pulmonary:      Effort: Pulmonary effort is normal.   Abdominal:      General: There is no distension.      Palpations: Abdomen is soft. Abdomen is not rigid.      Tenderness: There is no abdominal tenderness. There is no right CVA tenderness, left CVA tenderness or guarding.   Musculoskeletal:      Lumbar back: Normal.   Skin:     General: Skin is warm and dry.      Capillary Refill: Capillary refill takes less than 2 seconds.   Neurological:      Mental Status: She is alert and oriented to person, place, and time.   Psychiatric:         Mood and Affect: Mood normal.         Behavior: Behavior normal. Behavior is cooperative.         Thought Content: Thought content normal.         Assessment /Associated Orders:      1. Painful urination  POCT Urinalysis    phenazopyridine (PYRIDIUM) 200 MG Tab   2. Acute cystitis with hematuria  Urine Culture    nitrofurantoin (MACROBID) 100 MG Cap       Medical Decision Making:    Pt is clinically stable at today's acute urgent care visit.  No acute distress noted. Appropriate for outpatient management at this time.   Educated in proper administration of medication(s) ordered today including safety, possible SE, risks, " benefits, rationale and alternatives to therapy.   Keep well hydrated  Educated in UTI prevention/ risk reduction.       Advised to follow-up with the primary care provider for recheck, reevaluation, and consideration of further management if necessary.   Discussed management options (risks,benefits, and alternatives to treatment). Expressed understanding and the treatment plan was agreed upon. Questions were encouraged and answered       Return to urgent care prn if new or worsening sx or if there is no improvement in condition prn . Red flags discussed and indications to immediately call 911 or present to the Emergency Department.     I personally reviewed prior external notes and test results pertinent to today's visit.  I have independently reviewed and interpreted all diagnostics ordered during this urgent care acute visit.        - Previous UTI with positive culture for ecoli infection without resistance.   Time spent evaluating this patient was at least 30 minutes and includes preparing for visit, counseling/education, exam and evaluation, obtaining history, independent interpretation, ordering lab/test/procedures,medication management and documentation.

## 2021-05-07 DIAGNOSIS — N30.01 ACUTE CYSTITIS WITH HEMATURIA: ICD-10-CM

## 2022-01-25 ENCOUNTER — HOSPITAL ENCOUNTER (OUTPATIENT)
Facility: MEDICAL CENTER | Age: 32
End: 2022-01-25
Attending: PHYSICIAN ASSISTANT
Payer: COMMERCIAL

## 2022-01-25 ENCOUNTER — OFFICE VISIT (OUTPATIENT)
Dept: URGENT CARE | Facility: PHYSICIAN GROUP | Age: 32
End: 2022-01-25
Payer: COMMERCIAL

## 2022-01-25 VITALS
HEIGHT: 64 IN | OXYGEN SATURATION: 96 % | HEART RATE: 86 BPM | SYSTOLIC BLOOD PRESSURE: 112 MMHG | WEIGHT: 130 LBS | DIASTOLIC BLOOD PRESSURE: 78 MMHG | BODY MASS INDEX: 22.2 KG/M2 | RESPIRATION RATE: 16 BRPM | TEMPERATURE: 98.9 F

## 2022-01-25 DIAGNOSIS — N30.01 ACUTE CYSTITIS WITH HEMATURIA: ICD-10-CM

## 2022-01-25 DIAGNOSIS — J06.9 UPPER RESPIRATORY TRACT INFECTION, UNSPECIFIED TYPE: ICD-10-CM

## 2022-01-25 DIAGNOSIS — R30.0 DYSURIA: ICD-10-CM

## 2022-01-25 LAB
APPEARANCE UR: CLEAR
BILIRUB UR STRIP-MCNC: NORMAL MG/DL
COLOR UR AUTO: NORMAL
GLUCOSE UR STRIP.AUTO-MCNC: NORMAL MG/DL
KETONES UR STRIP.AUTO-MCNC: NORMAL MG/DL
LEUKOCYTE ESTERASE UR QL STRIP.AUTO: NORMAL
NITRITE UR QL STRIP.AUTO: NORMAL
PH UR STRIP.AUTO: 5 [PH] (ref 5–8)
PROT UR QL STRIP: NORMAL MG/DL
RBC UR QL AUTO: NORMAL
SP GR UR STRIP.AUTO: >=1.03
UROBILINOGEN UR STRIP-MCNC: NORMAL MG/DL

## 2022-01-25 PROCEDURE — 87086 URINE CULTURE/COLONY COUNT: CPT

## 2022-01-25 PROCEDURE — U0003 INFECTIOUS AGENT DETECTION BY NUCLEIC ACID (DNA OR RNA); SEVERE ACUTE RESPIRATORY SYNDROME CORONAVIRUS 2 (SARS-COV-2) (CORONAVIRUS DISEASE [COVID-19]), AMPLIFIED PROBE TECHNIQUE, MAKING USE OF HIGH THROUGHPUT TECHNOLOGIES AS DESCRIBED BY CMS-2020-01-R: HCPCS

## 2022-01-25 PROCEDURE — 81002 URINALYSIS NONAUTO W/O SCOPE: CPT | Performed by: PHYSICIAN ASSISTANT

## 2022-01-25 PROCEDURE — 99214 OFFICE O/P EST MOD 30 MIN: CPT | Performed by: PHYSICIAN ASSISTANT

## 2022-01-25 PROCEDURE — 87186 SC STD MICRODIL/AGAR DIL: CPT

## 2022-01-25 PROCEDURE — 87077 CULTURE AEROBIC IDENTIFY: CPT | Mod: 91

## 2022-01-25 PROCEDURE — U0005 INFEC AGEN DETEC AMPLI PROBE: HCPCS

## 2022-01-25 RX ORDER — NITROFURANTOIN 25; 75 MG/1; MG/1
100 CAPSULE ORAL EVERY 12 HOURS
Qty: 10 CAPSULE | Refills: 0 | Status: SHIPPED | OUTPATIENT
Start: 2022-01-25 | End: 2022-01-30

## 2022-01-25 RX ORDER — PHENAZOPYRIDINE HYDROCHLORIDE 200 MG/1
200 TABLET, FILM COATED ORAL 3 TIMES DAILY
Qty: 6 TABLET | Refills: 0 | Status: SHIPPED | OUTPATIENT
Start: 2022-01-25 | End: 2022-01-27

## 2022-01-25 RX ORDER — CEPHALEXIN 500 MG/1
CAPSULE ORAL
COMMUNITY
Start: 2021-12-27 | End: 2023-01-13

## 2022-01-25 NOTE — PROGRESS NOTES
Chief Complaint   Patient presents with   • Cough     runny nose, fatigue x 1 week    • UTI     lowerback x 3 days        HISTORY OF PRESENT ILLNESS: Patient is a 31 y.o. female who presents today because she has 2 separate issues.    1.  She has had some nasal congestion, cough and clear runny nose for about a week.  Also endorses fatigue and a mild headache.  She has had Covid in the past.  This feels much less intense.  She took a rapid Covid test on the day 2 of her symptoms which was negative.    2.  She has a several day history of increased urinary urgency, frequency, dysuria and some mild bilateral lower back pain.  She has been taking Pyridium for her symptoms    Patient Active Problem List    Diagnosis Date Noted   • Other diseases of nasal cavity and sinuses 07/27/2015   • Fibromyalgia 01/09/2014   • Palpitations 04/03/2012   • APC (atrial premature contractions) 04/03/2012   • VPC (ventricular premature complex) 04/03/2012   • Atypical chest pain 04/03/2012   • Shortness of breath 04/03/2012   • Hypothyroid 11/11/2011   • Acne 11/11/2011   • Contraception 11/11/2011       Allergies:Morphine, Other food, Pcn [penicillins], Peanuts [peanut oil], and Tetracycline    Current Outpatient Medications Ordered in Epic   Medication Sig Dispense Refill   • nitrofurantoin (MACROBID) 100 MG Cap Take 1 Capsule by mouth every 12 hours for 5 days. 10 Capsule 0   • phenazopyridine (PYRIDIUM) 200 MG Tab Take 1 Tablet by mouth 3 times a day for 2 days. 6 Tablet 0   • albuterol (VENTOLIN OR PROVENTIL) 108 (90 BASE) MCG/ACT Aero Soln inhalation aerosol Inhale 2 Puffs by mouth every 6 hours as needed for Shortness of Breath. 8.5 g 1   • IV Sets-Tubing (LTXF PRIM CNV PIN MICRODRIP) Misc by Does not apply route.     • albuterol (VENTOLIN OR PROVENTIL) 108 (90 BASE) MCG/ACT Aero Soln inhalation aerosol Inhale 2 Puffs by mouth every 6 hours as needed for Shortness of Breath. 8.5 g 0   • fluticasone (FLONASE) 50 MCG/ACT nasal  "spray Spray 2 Sprays in nose every day. 16 g 0   • famotidine (PEPCID) 20 MG TABS Take 20 mg by mouth at bedtime as needed.     • Probiotic Product (PROBIOTIC DAILY PO) Take  by mouth every day.     • levothyroxine (SYNTHROID) 75 MCG TABS Take 1 Tab by mouth every day. 90 Tab 3   • cephALEXin (KEFLEX) 500 MG Cap TAKE 1 CAPSULE BY MOUTH TWICE DAILY FOR 10 DAYS     • phenazopyridine (PYRIDIUM) 200 MG Tab Take 1 tablet by mouth 3 times a day as needed. 6 tablet 0   • Montelukast Sodium (SINGULAIR PO) Take  by mouth. (Patient not taking: Reported on 1/25/2022)     • omeprazole (PRILOSEC) 20 MG delayed-release capsule Take 1 Cap by mouth every day. (Patient not taking: Reported on 5/6/2021) 30 Cap 0   • hyoscyamine (LEVSIN) 0.125 MG tablet Take 125 mcg by mouth every four hours as needed for Cramping.     • gabapentin (NEURONTIN) 300 MG CAPS Take 300 mg by mouth 3 times a day. (Patient not taking: Reported on 1/25/2022)       No current [facility]-ordered facility-administered medications on file.       Past Medical History:   Diagnosis Date   • Acne 11/11/2011   • Anesthesia     \"didn't properly sedate all the way\", woke up in the middle, recommedation for Propofol   • Bronchitis 2010   • Contraception 11/11/2011   • GERD (gastroesophageal reflux disease)    • Heart burn    • Hypothyroid 11/11/2011   • Indigestion    • Infectious disease 2007    c diff   • Insomnia    • Jaundice     as a baby   • Muscle disorder     muscle pain   • Other specified disorder of intestines 7/2015    constipation and diarrhea       Social History     Tobacco Use   • Smoking status: Never Smoker   • Smokeless tobacco: Never Used   Vaping Use   • Vaping Use: Never used   Substance Use Topics   • Alcohol use: No   • Drug use: No       Family Status   Relation Name Status   • Fa  Alive   • Mo  Alive   • Sis  Alive   • Bro  Alive   • Sis  Alive   • Sis  Alive     Family History   Problem Relation Age of Onset   • Stroke Father    • Hypertension Father " "       ROS:  Review of Systems   ENT: Positive for nasal congestion, no sore throat and neck pain.    Eyes: Negative for blurred vision.   Respiratory: Positive for cough, no sputum production, shortness of breath and wheezing.    Cardiovascular: Negative for chest pain, palpitations, orthopnea and leg swelling.   Gastrointestinal: Negative for heartburn, nausea, vomiting and abdominal pain.   Genitourinary: Positive for dysuria, urgency and frequency.     Exam:  /78   Pulse 86   Temp 37.2 °C (98.9 °F)   Resp 16   Ht 1.626 m (5' 4\")   Wt 59 kg (130 lb)   SpO2 96%   General:  Well nourished, well developed female in NAD  Head:Normocephalic, atraumatic  Eyes: PERRLA, EOM within normal limits, no conjunctival injection, no scleral icterus, visual fields and acuity grossly intact.  Ears: Normal shape and symmetry, no tenderness, no discharge. External canals are without any significant edema or erythema. Tympanic membranes are without any inflammation, no effusion. Gross auditory acuity is intact  Nose: Symmetrical without tenderness, no discharge.  Mouth: reasonable hygiene, no erythema exudates or tonsillar enlargement.  Neck: no masses, range of motion within normal limits, no tracheal deviation. No obvious thyroid enlargement.  Pulmonary: chest is symmetrical with respiration, no wheezes, crackles, or rhonchi.  Cardiovascular: regular rate and rhythm without murmurs, rubs, or gallops.  Extremities: no clubbing, cyanosis, or edema.    Unable to evaluate urinalysis secondary to standing from Pyridium    Please note that this dictation was created using voice recognition software. I have made every reasonable attempt to correct obvious errors, but I expect that there are errors of grammar and possibly content that I did not discover before finalizing the note.    Assessment/Plan:  1. Upper respiratory tract infection, unspecified type  SARS-CoV-2 PCR (24 hour In-House): Collect NP swab in VTM   2. Acute " cystitis with hematuria  nitrofurantoin (MACROBID) 100 MG Cap   3. Dysuria  POCT Urinalysis    Urine Culture    phenazopyridine (PYRIDIUM) 200 MG Tab   Discussed strict isolation until Covid test returns, over-the-counter symptomatic relief.  Increase p.o. fluids    Followup with primary care in the next 7-10 days if not significantly improving, return to the urgent care or go to the emergency room sooner for any worsening of symptoms.

## 2022-01-26 DIAGNOSIS — J06.9 UPPER RESPIRATORY TRACT INFECTION, UNSPECIFIED TYPE: ICD-10-CM

## 2022-01-26 LAB — COVID ORDER STATUS COVID19: NORMAL

## 2022-01-27 LAB
SARS-COV-2 RNA RESP QL NAA+PROBE: NOTDETECTED
SPECIMEN SOURCE: NORMAL

## 2022-01-28 LAB
BACTERIA UR CULT: ABNORMAL
SIGNIFICANT IND 70042: ABNORMAL
SITE SITE: ABNORMAL
SOURCE SOURCE: ABNORMAL

## 2023-01-10 ENCOUNTER — OFFICE VISIT (OUTPATIENT)
Dept: URGENT CARE | Facility: PHYSICIAN GROUP | Age: 33
End: 2023-01-10
Payer: COMMERCIAL

## 2023-01-10 VITALS
HEART RATE: 86 BPM | WEIGHT: 139 LBS | SYSTOLIC BLOOD PRESSURE: 100 MMHG | TEMPERATURE: 98.1 F | OXYGEN SATURATION: 97 % | HEIGHT: 64 IN | RESPIRATION RATE: 16 BRPM | BODY MASS INDEX: 23.73 KG/M2 | DIASTOLIC BLOOD PRESSURE: 62 MMHG

## 2023-01-10 DIAGNOSIS — J02.9 SORETHROAT: ICD-10-CM

## 2023-01-10 DIAGNOSIS — J02.0 ACUTE STREPTOCOCCAL PHARYNGITIS: Primary | ICD-10-CM

## 2023-01-10 LAB
INT CON NEG: NEGATIVE
INT CON POS: POSITIVE
S PYO AG THROAT QL: POSITIVE

## 2023-01-10 PROCEDURE — 87880 STREP A ASSAY W/OPTIC: CPT | Performed by: NURSE PRACTITIONER

## 2023-01-10 PROCEDURE — 99213 OFFICE O/P EST LOW 20 MIN: CPT | Performed by: NURSE PRACTITIONER

## 2023-01-10 RX ORDER — CEPHALEXIN 500 MG/1
500 CAPSULE ORAL 2 TIMES DAILY
Qty: 20 CAPSULE | Refills: 0 | Status: SHIPPED | OUTPATIENT
Start: 2023-01-10 | End: 2023-01-13

## 2023-01-10 RX ORDER — LEVOTHYROXINE SODIUM 100 MCG
100 TABLET ORAL DAILY
COMMUNITY
Start: 2022-10-28

## 2023-01-10 ASSESSMENT — ENCOUNTER SYMPTOMS
FEVER: 0
SWOLLEN GLANDS: 1
TROUBLE SWALLOWING: 1
SORE THROAT: 1
ABDOMINAL PAIN: 0
DIARRHEA: 1
FATIGUE: 1
NAUSEA: 1
VOMITING: 0

## 2023-01-10 NOTE — PROGRESS NOTES
"Subjective:     Veronica Mix is a 32 y.o. female who presents for Pharyngitis (X 2 days) and Fatigue (X 2-3 days)      Pharyngitis   This is a new problem. The current episode started in the past 7 days (3 days of sore throat that is progressivley worsening). The problem has been gradually worsening. The pain is worse on the right side. There has been no fever. The pain is at a severity of 4/10. Associated symptoms include congestion, diarrhea, ear pain, swollen glands and trouble swallowing. Pertinent negatives include no abdominal pain or vomiting. She has tried gargles for the symptoms. The treatment provided mild relief.   Fatigue  Associated symptoms include congestion, fatigue, nausea, a sore throat and swollen glands. Pertinent negatives include no abdominal pain, fever or vomiting.       Review of Systems   Constitutional:  Positive for fatigue and malaise/fatigue. Negative for fever.   HENT:  Positive for congestion, ear pain, sore throat and trouble swallowing.    Gastrointestinal:  Positive for diarrhea and nausea. Negative for abdominal pain and vomiting.     PMH:   Past Medical History:   Diagnosis Date    Acne 11/11/2011    Anesthesia     \"didn't properly sedate all the way\", woke up in the middle, recommedation for Propofol    Bronchitis 2010    Contraception 11/11/2011    GERD (gastroesophageal reflux disease)     Heart burn     Hypothyroid 11/11/2011    Indigestion     Infectious disease 2007    c diff    Insomnia     Jaundice     as a baby    Muscle disorder     muscle pain    Other specified disorder of intestines 7/2015    constipation and diarrhea     ALLERGIES:   Allergies   Allergen Reactions    Morphine      Hand turned beet red and started spreading    Other Food Itching     Blackberries-- swelling, rash    Pcn [Penicillins] Rash     Itching, \" little bumps\"    Peanuts [Peanut Oil]      Stomach swells, and stomach ache    Tetracycline      Red rash and blotches     SURGHX:   Past " "Surgical History:   Procedure Laterality Date    SEPTAL RECONSTRUCTION N/A 7/27/2015    Procedure: SEPTAL RECONSTRUCTION OPEN W/ GRAFTS=MEDICAL;  Surgeon: KATHRINE Hernandez M.D.;  Location: SURGERY SAME DAY [facility];  Service:     RHINOPLASTY N/A 7/27/2015    Procedure: RHINOPLASTY=COSMETIC;  Surgeon: KATHRINE Hernandez M.D.;  Location: SURGERY SAME DAY [facility];  Service:     SEPTOTURBINOPLASTY  12/23/2008    Performed by ARACELIS AGARWAL at SURGERY [facility]    ETHMOIDECTOMY  12/23/2008    Performed by ARACELIS AGARWAL at SURGERY [facility]    COLON OVERGROWTH  7/2/08    Performed by JG ENCARNACION at ENDOSCOPY [facility]S    MALACHI BY LAPAROSCOPY       SOCHX:   Social History     Socioeconomic History    Marital status: Single   Tobacco Use    Smoking status: Never    Smokeless tobacco: Never   Vaping Use    Vaping Use: Never used   Substance and Sexual Activity    Alcohol use: Yes     Comment: very occ    Drug use: No     FH:   Family History   Problem Relation Age of Onset    Stroke Father     Hypertension Father          Objective:   /62   Pulse 86   Temp 36.7 °C (98.1 °F) (Temporal)   Resp 16   Ht 1.626 m (5' 4\")   Wt 63 kg (139 lb)   SpO2 97%   BMI 23.86 kg/m²     Physical Exam  Vitals and nursing note reviewed.   Constitutional:       General: She is not in acute distress.     Appearance: Normal appearance. She is normal weight. She is ill-appearing.   HENT:      Head: Normocephalic and atraumatic.      Right Ear: Tympanic membrane, ear canal and external ear normal.      Left Ear: Tympanic membrane, ear canal and external ear normal.      Nose: No congestion or rhinorrhea.      Mouth/Throat:      Mouth: Mucous membranes are moist.      Pharynx: Posterior oropharyngeal erythema present. No oropharyngeal exudate.      Comments: Tonsils bilaterally +2, uvula midline  Eyes:      Extraocular Movements: Extraocular movements intact.      Pupils: Pupils are equal, " round, and reactive to light.   Cardiovascular:      Rate and Rhythm: Normal rate and regular rhythm.      Pulses: Normal pulses.      Heart sounds: Normal heart sounds.   Pulmonary:      Effort: Pulmonary effort is normal. No respiratory distress.      Breath sounds: Normal breath sounds. No stridor. No wheezing, rhonchi or rales.   Chest:      Chest wall: No tenderness.   Abdominal:      General: Abdomen is flat. Bowel sounds are normal.      Palpations: Abdomen is soft.      Tenderness: There is abdominal tenderness in the right lower quadrant. There is no right CVA tenderness or left CVA tenderness.   Musculoskeletal:         General: Normal range of motion.      Cervical back: Normal range of motion and neck supple. Tenderness present.   Lymphadenopathy:      Cervical: Cervical adenopathy present.   Skin:     General: Skin is warm and dry.      Capillary Refill: Capillary refill takes less than 2 seconds.   Neurological:      General: No focal deficit present.      Mental Status: She is alert and oriented to person, place, and time. Mental status is at baseline.   Psychiatric:         Mood and Affect: Mood normal.         Behavior: Behavior normal.         Thought Content: Thought content normal.         Judgment: Judgment normal.     Poct strep: positive  Assessment/Plan:   Assessment    1. Acute streptococcal pharyngitis  cephALEXin (KEFLEX) 500 MG Cap      2. Sorethroat  POCT Rapid Strep A      Veronica did test positive for strep in the clinic today. Keflex sent to the pharmacy for treatment. Isolation guidelines reviewed and work note given. We discussed supportive measures including humidifier, warm salt water gargles, over-the-counter Cepacol throat lozenges, rest  and increased fluids. Pt was encouraged to seek treatment back in the ER or urgent care for worsening symptoms,  fever greater than 100.5, wheezes or shortness of breath.   Pt educated on red flags and when to seek treatment back in ER or UC.

## 2023-01-10 NOTE — LETTER
January 10, 2023    To Whom It May Concern:         This is confirmation that Veronica Mix attended her scheduled appointment with PEDRO Lora on 1/10/23.  Please excuse her absence due to an acute strep infection. She may return to work on 1/12/2022 or sooner if better.        If you have any questions please do not hesitate to call me at the phone number listed below.    Sincerely,          REVA Lora.  [phone]

## 2023-01-13 ENCOUNTER — OFFICE VISIT (OUTPATIENT)
Dept: URGENT CARE | Facility: PHYSICIAN GROUP | Age: 33
End: 2023-01-13
Payer: COMMERCIAL

## 2023-01-13 VITALS
OXYGEN SATURATION: 97 % | WEIGHT: 139 LBS | TEMPERATURE: 99.9 F | BODY MASS INDEX: 23.73 KG/M2 | DIASTOLIC BLOOD PRESSURE: 72 MMHG | HEART RATE: 88 BPM | RESPIRATION RATE: 16 BRPM | SYSTOLIC BLOOD PRESSURE: 108 MMHG | HEIGHT: 64 IN

## 2023-01-13 DIAGNOSIS — J02.0 STREP PHARYNGITIS: ICD-10-CM

## 2023-01-13 PROCEDURE — 99213 OFFICE O/P EST LOW 20 MIN: CPT | Performed by: PHYSICIAN ASSISTANT

## 2023-01-13 RX ORDER — DEXAMETHASONE SODIUM PHOSPHATE 10 MG/ML
10 INJECTION INTRAMUSCULAR; INTRAVENOUS ONCE
Status: COMPLETED | OUTPATIENT
Start: 2023-01-13 | End: 2023-01-13

## 2023-01-13 RX ORDER — CLINDAMYCIN HYDROCHLORIDE 300 MG/1
300 CAPSULE ORAL 3 TIMES DAILY
Qty: 30 CAPSULE | Refills: 0 | Status: SHIPPED | OUTPATIENT
Start: 2023-01-13 | End: 2023-01-23

## 2023-01-13 RX ADMIN — DEXAMETHASONE SODIUM PHOSPHATE 10 MG: 10 INJECTION INTRAMUSCULAR; INTRAVENOUS at 09:55

## 2023-01-13 ASSESSMENT — ENCOUNTER SYMPTOMS
COUGH: 0
HEADACHES: 0
FEVER: 0
SHORTNESS OF BREATH: 0
DIARRHEA: 0
DIZZINESS: 0
SINUS PAIN: 0
CONSTIPATION: 0
ABDOMINAL PAIN: 0
VOMITING: 0
DIAPHORESIS: 0
EYE DISCHARGE: 0
SORE THROAT: 1
EYE PAIN: 0
CHILLS: 0
WHEEZING: 0
EYE REDNESS: 0
NAUSEA: 0

## 2023-01-13 NOTE — PROGRESS NOTES
"  Subjective:     Veronica Mix  is a 32 y.o. female who presents for Pharyngitis (Patient was seen 1/10 antibiotics don't seem to be working, would like liquid form if possible hard to swallow pills, sore throat is worse, still having fevers)       She presents today with worsening pharyngitis and pain with swallowing.  No dysphagia at this time.  Please recall she was seen in the urgent care on 1/10/2023 diagnosed with strep pharyngitis and started on Keflex; however, she has not seen any symptom relief with this medication.  She has disposed of contaminated water bottles and toothbrushes and been routinely cleansing as to prevent reinfection.  She has been using Tylenol and ibuprofen for additional symptom relief.  Denies fever/chills/sweats, chest pain, shortness of breath, nausea/vomiting, abdominal pain, diarrhea.     Review of Systems   Constitutional:  Negative for chills, diaphoresis, fever and malaise/fatigue.   HENT:  Positive for sore throat. Negative for congestion, ear discharge and sinus pain.    Eyes:  Negative for pain, discharge and redness.   Respiratory:  Negative for cough, shortness of breath and wheezing.    Cardiovascular:  Negative for chest pain.   Gastrointestinal:  Negative for abdominal pain, constipation, diarrhea, nausea and vomiting.   Neurological:  Negative for dizziness and headaches.    Allergies   Allergen Reactions    Morphine      Hand turned beet red and started spreading    Other Food Itching     Blackberries-- swelling, rash    Pcn [Penicillins] Rash     Itching, \" little bumps\"    Peanuts [Peanut Oil]      Stomach swells, and stomach ache    Tetracycline      Red rash and blotches     Past Medical History:   Diagnosis Date    Acne 11/11/2011    Anesthesia     \"didn't properly sedate all the way\", woke up in the middle, recommedation for Propofol    Bronchitis 2010    Contraception 11/11/2011    GERD (gastroesophageal reflux disease)     Heart burn     Hypothyroid " "11/11/2011    Indigestion     Infectious disease 2007    c diff    Insomnia     Jaundice     as a baby    Muscle disorder     muscle pain    Other specified disorder of intestines 7/2015    constipation and diarrhea        Objective:   /72   Pulse 88   Temp 37.7 °C (99.9 °F) (Temporal)   Resp 16   Ht 1.626 m (5' 4\")   Wt 63 kg (139 lb)   SpO2 97%   BMI 23.86 kg/m²   Physical Exam  Vitals and nursing note reviewed.   Constitutional:       General: She is not in acute distress.     Appearance: She is not ill-appearing or toxic-appearing.   HENT:      Head: Normocephalic.      Nose: No rhinorrhea.      Mouth/Throat:      Mouth: Mucous membranes are moist.      Pharynx: Oropharyngeal exudate and posterior oropharyngeal erythema present.      Comments: Grade 3 tonsillar edema, bilaterally    Eyes:      General: No scleral icterus.     Conjunctiva/sclera: Conjunctivae normal.   Pulmonary:      Effort: Pulmonary effort is normal. No respiratory distress.      Breath sounds: No stridor.   Musculoskeletal:      Cervical back: Neck supple.   Neurological:      Mental Status: She is alert and oriented to person, place, and time.   Psychiatric:         Mood and Affect: Mood normal.         Behavior: Behavior normal.         Thought Content: Thought content normal.         Judgment: Judgment normal.           Diagnostic testing: None    Assessment/Plan:     Encounter Diagnoses   Name Primary?    Strep pharyngitis           Plan for care for today's complaint includes 10 mg oral Decadron in office today for acute tonsillar edema and pharyngitis pain, will have the patient discontinue her Keflex prescription and start clindamycin for strep pharyngitis infection.  Continue alternating Tylenol and ibuprofen as needed for additional pain relief.  Continue to monitor symptoms and return to urgent care or follow-up with primary care provider if symptoms remain ongoing.  Follow-up in the emergency department if symptoms " become severe, ER precautions discussed in office today..  Prescription for clindamycin provided.    See AVS Instructions below for written guidance provided to patient on after-visit management and care in addition to our verbal discussion during the visit.    Please note that this dictation was created using voice recognition software. I have attempted to correct all errors, but there may be sound-alike, spelling, grammar and possibly content errors that I did not discover before finalizing the note.    Harley Ogden PA-C

## 2024-02-09 ENCOUNTER — NON-PROVIDER VISIT (OUTPATIENT)
Dept: URGENT CARE | Facility: PHYSICIAN GROUP | Age: 34
End: 2024-02-09

## 2024-02-09 ENCOUNTER — HOSPITAL ENCOUNTER (OUTPATIENT)
Facility: MEDICAL CENTER | Age: 34
End: 2024-02-09
Attending: STUDENT IN AN ORGANIZED HEALTH CARE EDUCATION/TRAINING PROGRAM
Payer: COMMERCIAL

## 2024-02-09 DIAGNOSIS — Z11.1 SCREENING-PULMONARY TB: ICD-10-CM

## 2024-02-09 PROCEDURE — 86480 TB TEST CELL IMMUN MEASURE: CPT

## 2024-02-09 NOTE — PROGRESS NOTES
Veronica Kelsi Dominguez is a 33 y.o. female here for a non-provider visit for tb quant    If abnormal was an in office provider notified today (if so, indicate provider)? No    Routed to PCP? No

## 2024-02-12 LAB
GAMMA INTERFERON BACKGROUND BLD IA-ACNC: 0.04 IU/ML
M TB IFN-G BLD-IMP: NEGATIVE
M TB IFN-G CD4+ BCKGRND COR BLD-ACNC: 0.01 IU/ML
MITOGEN IGNF BCKGRD COR BLD-ACNC: >10 IU/ML
QFT TB2 - NIL TBQ2: 0.01 IU/ML

## 2024-03-08 ENCOUNTER — OFFICE VISIT (OUTPATIENT)
Dept: URGENT CARE | Facility: PHYSICIAN GROUP | Age: 34
End: 2024-03-08
Payer: COMMERCIAL

## 2024-03-08 VITALS
TEMPERATURE: 97.6 F | RESPIRATION RATE: 18 BRPM | HEART RATE: 82 BPM | OXYGEN SATURATION: 99 % | DIASTOLIC BLOOD PRESSURE: 72 MMHG | WEIGHT: 150 LBS | HEIGHT: 64 IN | BODY MASS INDEX: 25.61 KG/M2 | SYSTOLIC BLOOD PRESSURE: 118 MMHG

## 2024-03-08 DIAGNOSIS — R68.89 FLU-LIKE SYMPTOMS: ICD-10-CM

## 2024-03-08 DIAGNOSIS — J02.9 ACUTE PHARYNGITIS, UNSPECIFIED ETIOLOGY: ICD-10-CM

## 2024-03-08 LAB
FLUAV RNA SPEC QL NAA+PROBE: NEGATIVE
FLUBV RNA SPEC QL NAA+PROBE: NEGATIVE
RSV RNA SPEC QL NAA+PROBE: NEGATIVE
S PYO DNA SPEC NAA+PROBE: NOT DETECTED
SARS-COV-2 RNA RESP QL NAA+PROBE: NEGATIVE

## 2024-03-08 PROCEDURE — 0241U POCT CEPHEID COV-2, FLU A/B, RSV - PCR: CPT | Performed by: NURSE PRACTITIONER

## 2024-03-08 PROCEDURE — 3078F DIAST BP <80 MM HG: CPT | Performed by: NURSE PRACTITIONER

## 2024-03-08 PROCEDURE — 3074F SYST BP LT 130 MM HG: CPT | Performed by: NURSE PRACTITIONER

## 2024-03-08 PROCEDURE — 99213 OFFICE O/P EST LOW 20 MIN: CPT | Performed by: NURSE PRACTITIONER

## 2024-03-08 PROCEDURE — 87651 STREP A DNA AMP PROBE: CPT | Performed by: NURSE PRACTITIONER

## 2024-03-08 RX ORDER — AZITHROMYCIN 250 MG/1
TABLET, FILM COATED ORAL
COMMUNITY
Start: 2024-01-16 | End: 2024-03-08

## 2024-03-08 RX ORDER — DEXTROMETHORPHAN HYDROBROMIDE AND PROMETHAZINE HYDROCHLORIDE 15; 6.25 MG/5ML; MG/5ML
SYRUP ORAL
COMMUNITY
Start: 2024-01-16 | End: 2024-03-08

## 2024-03-08 RX ORDER — LIDOCAINE HYDROCHLORIDE 20 MG/ML
SOLUTION OROPHARYNGEAL
Qty: 100 ML | Refills: 1 | Status: SHIPPED | OUTPATIENT
Start: 2024-03-08

## 2024-03-08 NOTE — PROGRESS NOTES
"Subjective:   Veronica Mix is a 33 y.o. female who presents for Sore Throat (X2 weeks )    Patient is a 33-year-old female presents clinic today reporting 2-week history of feeling that her throat is swollen, mild irritated throat, headache, slight nasal congestion, fatigue, intermittent chills, and intermittent nausea.  She has not had any cough, shortness of breath, vomiting, diarrhea, rashes, or recorded fevers.  Patient did look at the back of her throat today and noticed that there is a lot of white spots.  Has been taking over-the-counter ibuprofen and vitamin C which has helped some.  Was diagnosed with strep throat back in January, was on 2 rounds of antibiotics originally was on Keflex and switched to clindamycin.  Patient does state that all symptoms did resolve.    Medications, Allergies, and current problem list reviewed today in Epic.     Objective:     /72   Pulse 82   Temp 36.4 °C (97.6 °F) (Temporal)   Resp 18   Ht 1.626 m (5' 4\")   Wt 68 kg (150 lb)   SpO2 99%     Physical Exam  Vitals reviewed.   Constitutional:       General: She is not in acute distress.     Appearance: Normal appearance. She is ill-appearing. She is not toxic-appearing.   HENT:      Head: Normocephalic.      Right Ear: Tympanic membrane, ear canal and external ear normal.      Left Ear: Tympanic membrane, ear canal and external ear normal.      Nose: Rhinorrhea present. No mucosal edema or congestion. Rhinorrhea is clear.      Mouth/Throat:      Lips: Pink. No lesions.      Mouth: Mucous membranes are moist.      Palate: No lesions.      Pharynx: Oropharynx is clear. Uvula midline. Posterior oropharyngeal erythema present. No pharyngeal swelling, oropharyngeal exudate or uvula swelling.      Tonsils: No tonsillar exudate or tonsillar abscesses.   Eyes:      Extraocular Movements: Extraocular movements intact.      Conjunctiva/sclera: Conjunctivae normal.      Pupils: Pupils are equal, round, and reactive to " light.   Cardiovascular:      Rate and Rhythm: Normal rate and regular rhythm.   Pulmonary:      Effort: Pulmonary effort is normal. No respiratory distress.      Breath sounds: Normal breath sounds. No stridor. No wheezing, rhonchi or rales.   Musculoskeletal:         General: Normal range of motion.      Cervical back: Normal range of motion and neck supple.   Lymphadenopathy:      Head:      Right side of head: No submental, submandibular or tonsillar adenopathy.      Left side of head: No submental, submandibular or tonsillar adenopathy.      Cervical: No cervical adenopathy.   Skin:     General: Skin is warm and dry.   Neurological:      Mental Status: She is alert and oriented to person, place, and time.   Psychiatric:         Mood and Affect: Mood normal.         Behavior: Behavior normal.         Thought Content: Thought content normal.         Judgment: Judgment normal.       Results for orders placed or performed in visit on 03/08/24   POCT GROUP A STREP, PCR   Result Value Ref Range    POC Group A Strep, PCR Not Detected Not Detected, Invalid   POCT CoV-2, Flu A/B, RSV by PCR   Result Value Ref Range    SARS-CoV-2 by PCR Negative Negative, Invalid    Influenza virus A RNA Negative Negative, Invalid    Influenza virus B, PCR Negative Negative, Invalid    RSV, PCR Negative Negative, Invalid         Assessment/Plan:     Diagnosis and associated orders:     1. Acute pharyngitis, unspecified etiology  POCT GROUP A STREP, PCR    POCT CoV-2, Flu A/B, RSV by PCR    lidocaine (XYLOCAINE) 2 % Solution    CULTURE THROAT      2. Flu-like symptoms  POCT GROUP A STREP, PCR    POCT CoV-2, Flu A/B, RSV by PCR         Comments/MDM:     POCT strep, COVID, influenza, RSV all negative.  Etiology unknown, most likely viral in nature.  No signs of bacterial infection.  Viscous lidocaine prescribed.  Aorta is patent.  Vital signs all within normal range.  Recommended over-the-counter Tylenol, Motrin, salt water gargles, throat  coat tea, and warm tea with honey and lemon.  Throat culture pending-will result via MyChart.  Patient was involved with shared decision-making throughout the exam today and verbalizes understanding regards to plan of care, discharge instructions, and follow-up         Differential diagnosis, natural history, supportive care, and indications for immediate follow-up discussed.    Advised the patient to follow-up with the primary care physician for recheck, reevaluation, and consideration of further management.    I personally reviewed prior external notes and test results pertinent to today's visit as well as additional imaging and testing completed in clinic today.     Please note that this dictation was created using voice recognition software. I have made a reasonable attempt to correct obvious errors, but I expect that there are errors of grammar and possibly content that I did not discover before finalizing the note.